# Patient Record
Sex: MALE | Race: OTHER | HISPANIC OR LATINO | URBAN - METROPOLITAN AREA
[De-identification: names, ages, dates, MRNs, and addresses within clinical notes are randomized per-mention and may not be internally consistent; named-entity substitution may affect disease eponyms.]

---

## 2023-08-30 ENCOUNTER — HOSPITAL ENCOUNTER (OUTPATIENT)
Facility: HOSPITAL | Age: 28
Discharge: HOME | End: 2023-09-01
Attending: STUDENT IN AN ORGANIZED HEALTH CARE EDUCATION/TRAINING PROGRAM | Admitting: INTERNAL MEDICINE

## 2023-08-30 DIAGNOSIS — E86.0 DEHYDRATION: ICD-10-CM

## 2023-08-30 DIAGNOSIS — M62.82 NON-TRAUMATIC RHABDOMYOLYSIS: ICD-10-CM

## 2023-08-30 DIAGNOSIS — N17.9 AKI (ACUTE KIDNEY INJURY): Primary | ICD-10-CM

## 2023-08-30 LAB
ALBUMIN SERPL-MCNC: 5.9 G/DL (ref 3.5–5)
ALBUMIN/GLOB SERPL: 1.5 RATIO (ref 1.1–2)
ALP SERPL-CCNC: 83 UNIT/L (ref 40–150)
ALT SERPL-CCNC: 27 UNIT/L (ref 0–55)
AST SERPL-CCNC: 20 UNIT/L (ref 5–34)
BASOPHILS # BLD AUTO: 0.07 X10(3)/MCL
BASOPHILS NFR BLD AUTO: 0.4 %
BILIRUB SERPL-MCNC: 0.6 MG/DL
BUN SERPL-MCNC: 24.9 MG/DL (ref 8.9–20.6)
CALCIUM SERPL-MCNC: 11.2 MG/DL (ref 8.4–10.2)
CHLORIDE SERPL-SCNC: 101 MMOL/L (ref 98–107)
CK SERPL-CCNC: 301 U/L (ref 30–200)
CO2 SERPL-SCNC: 16 MMOL/L (ref 22–29)
CREAT SERPL-MCNC: 3.43 MG/DL (ref 0.73–1.18)
EOSINOPHIL # BLD AUTO: 0.02 X10(3)/MCL (ref 0–0.9)
EOSINOPHIL NFR BLD AUTO: 0.1 %
ERYTHROCYTE [DISTWIDTH] IN BLOOD BY AUTOMATED COUNT: 12.7 % (ref 11.5–17)
GFR SERPLBLD CREATININE-BSD FMLA CKD-EPI: 24 MLS/MIN/1.73/M2
GLOBULIN SER-MCNC: 3.9 GM/DL (ref 2.4–3.5)
GLUCOSE SERPL-MCNC: 139 MG/DL (ref 74–100)
HCT VFR BLD AUTO: 47 % (ref 42–52)
HGB BLD-MCNC: 17.6 G/DL (ref 14–18)
IMM GRANULOCYTES # BLD AUTO: 0.1 X10(3)/MCL (ref 0–0.04)
IMM GRANULOCYTES NFR BLD AUTO: 0.6 %
LYMPHOCYTES # BLD AUTO: 1.71 X10(3)/MCL (ref 0.6–4.6)
LYMPHOCYTES NFR BLD AUTO: 10.8 %
MAGNESIUM SERPL-MCNC: 2 MG/DL (ref 1.6–2.6)
MCH RBC QN AUTO: 29.5 PG (ref 27–31)
MCHC RBC AUTO-ENTMCNC: 37.4 G/DL (ref 33–36)
MCV RBC AUTO: 78.7 FL (ref 80–94)
MONOCYTES # BLD AUTO: 0.65 X10(3)/MCL (ref 0.1–1.3)
MONOCYTES NFR BLD AUTO: 4.1 %
NEUTROPHILS # BLD AUTO: 13.32 X10(3)/MCL (ref 2.1–9.2)
NEUTROPHILS NFR BLD AUTO: 84 %
NRBC BLD AUTO-RTO: 0 %
PLATELET # BLD AUTO: 515 X10(3)/MCL (ref 130–400)
PMV BLD AUTO: 9 FL (ref 7.4–10.4)
POTASSIUM SERPL-SCNC: 3.5 MMOL/L (ref 3.5–5.1)
PROT SERPL-MCNC: 9.8 GM/DL (ref 6.4–8.3)
RBC # BLD AUTO: 5.97 X10(6)/MCL (ref 4.7–6.1)
SODIUM SERPL-SCNC: 138 MMOL/L (ref 136–145)
WBC # SPEC AUTO: 15.87 X10(3)/MCL (ref 4.5–11.5)

## 2023-08-30 PROCEDURE — 63600175 PHARM REV CODE 636 W HCPCS: Performed by: STUDENT IN AN ORGANIZED HEALTH CARE EDUCATION/TRAINING PROGRAM

## 2023-08-30 PROCEDURE — 80053 COMPREHEN METABOLIC PANEL: CPT | Performed by: NURSE PRACTITIONER

## 2023-08-30 PROCEDURE — 85025 COMPLETE CBC W/AUTO DIFF WBC: CPT | Performed by: NURSE PRACTITIONER

## 2023-08-30 PROCEDURE — 25000003 PHARM REV CODE 250: Performed by: NURSE PRACTITIONER

## 2023-08-30 PROCEDURE — 96360 HYDRATION IV INFUSION INIT: CPT

## 2023-08-30 PROCEDURE — 82550 ASSAY OF CK (CPK): CPT | Performed by: NURSE PRACTITIONER

## 2023-08-30 PROCEDURE — 96361 HYDRATE IV INFUSION ADD-ON: CPT

## 2023-08-30 PROCEDURE — 83735 ASSAY OF MAGNESIUM: CPT | Performed by: NURSE PRACTITIONER

## 2023-08-30 PROCEDURE — 99285 EMERGENCY DEPT VISIT HI MDM: CPT | Mod: 25

## 2023-08-30 RX ADMIN — SODIUM CHLORIDE, POTASSIUM CHLORIDE, SODIUM LACTATE AND CALCIUM CHLORIDE 1000 ML: 600; 310; 30; 20 INJECTION, SOLUTION INTRAVENOUS at 11:08

## 2023-08-30 RX ADMIN — SODIUM CHLORIDE 1000 ML: 9 INJECTION, SOLUTION INTRAVENOUS at 10:08

## 2023-08-30 NOTE — Clinical Note
Diagnosis: AJ (acute kidney injury) [655524]   Future Attending Provider: DUC BARTH [[id number]]   Admitting Provider:: DUC BARTH [[id number]]

## 2023-08-31 PROBLEM — M62.82 RHABDOMYOLYSIS: Status: ACTIVE | Noted: 2023-08-31

## 2023-08-31 PROBLEM — N17.9 AKI (ACUTE KIDNEY INJURY): Status: ACTIVE | Noted: 2023-08-31

## 2023-08-31 PROBLEM — D72.829 LEUKOCYTOSIS: Status: ACTIVE | Noted: 2023-08-31

## 2023-08-31 LAB
ALBUMIN SERPL-MCNC: 4.3 G/DL (ref 3.5–5)
ALBUMIN/GLOB SERPL: 1.4 RATIO (ref 1.1–2)
ALP SERPL-CCNC: 64 UNIT/L (ref 40–150)
ALT SERPL-CCNC: 19 UNIT/L (ref 0–55)
AMPHET UR QL SCN: NEGATIVE
ANION GAP SERPL CALC-SCNC: 17 MEQ/L
APPEARANCE UR: CLEAR
AST SERPL-CCNC: 22 UNIT/L (ref 5–34)
BACTERIA #/AREA URNS AUTO: ABNORMAL /HPF
BARBITURATE SCN PRESENT UR: NEGATIVE
BASOPHILS # BLD AUTO: 0.04 X10(3)/MCL
BASOPHILS NFR BLD AUTO: 0.4 %
BENZODIAZ UR QL SCN: NEGATIVE
BILIRUB SERPL-MCNC: 0.7 MG/DL
BILIRUB UR QL STRIP.AUTO: NEGATIVE
BUN SERPL-MCNC: 16.2 MG/DL (ref 8.9–20.6)
BUN SERPL-MCNC: 20.8 MG/DL (ref 8.9–20.6)
CALCIUM SERPL-MCNC: 9.4 MG/DL (ref 8.4–10.2)
CALCIUM SERPL-MCNC: 9.4 MG/DL (ref 8.4–10.2)
CANNABINOIDS UR QL SCN: POSITIVE
CHLORIDE SERPL-SCNC: 105 MMOL/L (ref 98–107)
CHLORIDE SERPL-SCNC: 107 MMOL/L (ref 98–107)
CK SERPL-CCNC: 386 U/L (ref 30–200)
CK SERPL-CCNC: 716 U/L (ref 30–200)
CO2 SERPL-SCNC: 19 MMOL/L (ref 22–29)
CO2 SERPL-SCNC: 25 MMOL/L (ref 22–29)
COCAINE UR QL SCN: NEGATIVE
COLOR UR: ABNORMAL
CREAT SERPL-MCNC: 1.48 MG/DL (ref 0.73–1.18)
CREAT SERPL-MCNC: 2.23 MG/DL (ref 0.73–1.18)
CREAT/UREA NIT SERPL: 9
EOSINOPHIL # BLD AUTO: 0.05 X10(3)/MCL (ref 0–0.9)
EOSINOPHIL NFR BLD AUTO: 0.5 %
ERYTHROCYTE [DISTWIDTH] IN BLOOD BY AUTOMATED COUNT: 13 % (ref 11.5–17)
FENTANYL UR QL SCN: NEGATIVE
GFR SERPLBLD CREATININE-BSD FMLA CKD-EPI: 40 MLS/MIN/1.73/M2
GFR SERPLBLD CREATININE-BSD FMLA CKD-EPI: >60 MLS/MIN/1.73/M2
GLOBULIN SER-MCNC: 3 GM/DL (ref 2.4–3.5)
GLUCOSE SERPL-MCNC: 101 MG/DL (ref 74–100)
GLUCOSE SERPL-MCNC: 110 MG/DL (ref 74–100)
GLUCOSE UR QL STRIP.AUTO: NEGATIVE
HCT VFR BLD AUTO: 41.7 % (ref 42–52)
HGB BLD-MCNC: 14.7 G/DL (ref 14–18)
IMM GRANULOCYTES # BLD AUTO: 0.03 X10(3)/MCL (ref 0–0.04)
IMM GRANULOCYTES NFR BLD AUTO: 0.3 %
KETONES UR QL STRIP.AUTO: NEGATIVE
LEUKOCYTE ESTERASE UR QL STRIP.AUTO: NEGATIVE
LYMPHOCYTES # BLD AUTO: 2.54 X10(3)/MCL (ref 0.6–4.6)
LYMPHOCYTES NFR BLD AUTO: 25.7 %
MCH RBC QN AUTO: 28.9 PG (ref 27–31)
MCHC RBC AUTO-ENTMCNC: 35.3 G/DL (ref 33–36)
MCV RBC AUTO: 81.9 FL (ref 80–94)
MDMA UR QL SCN: NEGATIVE
MONOCYTES # BLD AUTO: 0.72 X10(3)/MCL (ref 0.1–1.3)
MONOCYTES NFR BLD AUTO: 7.3 %
NEUTROPHILS # BLD AUTO: 6.5 X10(3)/MCL (ref 2.1–9.2)
NEUTROPHILS NFR BLD AUTO: 65.8 %
NITRITE UR QL STRIP.AUTO: NEGATIVE
NRBC BLD AUTO-RTO: 0 %
OPIATES UR QL SCN: NEGATIVE
PCP UR QL: NEGATIVE
PH UR STRIP.AUTO: 6 [PH]
PH UR: 6.5 [PH] (ref 3–11)
PLATELET # BLD AUTO: 437 X10(3)/MCL (ref 130–400)
PMV BLD AUTO: 9.1 FL (ref 7.4–10.4)
POTASSIUM SERPL-SCNC: 3.7 MMOL/L (ref 3.5–5.1)
POTASSIUM SERPL-SCNC: 3.8 MMOL/L (ref 3.5–5.1)
PROT SERPL-MCNC: 7.3 GM/DL (ref 6.4–8.3)
PROT UR QL STRIP.AUTO: ABNORMAL
RBC # BLD AUTO: 5.09 X10(6)/MCL (ref 4.7–6.1)
RBC #/AREA URNS AUTO: ABNORMAL /HPF
RBC UR QL AUTO: ABNORMAL
SODIUM SERPL-SCNC: 141 MMOL/L (ref 136–145)
SODIUM SERPL-SCNC: 141 MMOL/L (ref 136–145)
SP GR UR STRIP.AUTO: 1.01 (ref 1–1.03)
SQUAMOUS #/AREA URNS AUTO: ABNORMAL /HPF
UROBILINOGEN UR STRIP-ACNC: 0.2
WBC # SPEC AUTO: 9.88 X10(3)/MCL (ref 4.5–11.5)
WBC #/AREA URNS AUTO: ABNORMAL /HPF

## 2023-08-31 PROCEDURE — G0378 HOSPITAL OBSERVATION PER HR: HCPCS

## 2023-08-31 PROCEDURE — 96361 HYDRATE IV INFUSION ADD-ON: CPT

## 2023-08-31 PROCEDURE — 82550 ASSAY OF CK (CPK): CPT | Performed by: STUDENT IN AN ORGANIZED HEALTH CARE EDUCATION/TRAINING PROGRAM

## 2023-08-31 PROCEDURE — 63600175 PHARM REV CODE 636 W HCPCS: Performed by: INTERNAL MEDICINE

## 2023-08-31 PROCEDURE — 80307 DRUG TEST PRSMV CHEM ANLYZR: CPT | Performed by: INTERNAL MEDICINE

## 2023-08-31 PROCEDURE — 80053 COMPREHEN METABOLIC PANEL: CPT | Performed by: STUDENT IN AN ORGANIZED HEALTH CARE EDUCATION/TRAINING PROGRAM

## 2023-08-31 PROCEDURE — 81001 URINALYSIS AUTO W/SCOPE: CPT | Performed by: NURSE PRACTITIONER

## 2023-08-31 PROCEDURE — 63600175 PHARM REV CODE 636 W HCPCS: Performed by: STUDENT IN AN ORGANIZED HEALTH CARE EDUCATION/TRAINING PROGRAM

## 2023-08-31 PROCEDURE — 82550 ASSAY OF CK (CPK): CPT | Mod: 91 | Performed by: INTERNAL MEDICINE

## 2023-08-31 PROCEDURE — 85025 COMPLETE CBC W/AUTO DIFF WBC: CPT | Performed by: STUDENT IN AN ORGANIZED HEALTH CARE EDUCATION/TRAINING PROGRAM

## 2023-08-31 PROCEDURE — 80048 BASIC METABOLIC PNL TOTAL CA: CPT | Performed by: STUDENT IN AN ORGANIZED HEALTH CARE EDUCATION/TRAINING PROGRAM

## 2023-08-31 RX ORDER — TALC
6 POWDER (GRAM) TOPICAL NIGHTLY PRN
Status: DISCONTINUED | OUTPATIENT
Start: 2023-08-31 | End: 2023-09-01 | Stop reason: HOSPADM

## 2023-08-31 RX ORDER — SODIUM CHLORIDE, SODIUM LACTATE, POTASSIUM CHLORIDE, CALCIUM CHLORIDE 600; 310; 30; 20 MG/100ML; MG/100ML; MG/100ML; MG/100ML
1000 INJECTION, SOLUTION INTRAVENOUS
Status: COMPLETED | OUTPATIENT
Start: 2023-08-31 | End: 2023-08-31

## 2023-08-31 RX ORDER — SODIUM CHLORIDE 0.9 % (FLUSH) 0.9 %
10 SYRINGE (ML) INJECTION
Status: DISCONTINUED | OUTPATIENT
Start: 2023-08-31 | End: 2023-09-01 | Stop reason: HOSPADM

## 2023-08-31 RX ORDER — SODIUM CHLORIDE, SODIUM LACTATE, POTASSIUM CHLORIDE, CALCIUM CHLORIDE 600; 310; 30; 20 MG/100ML; MG/100ML; MG/100ML; MG/100ML
INJECTION, SOLUTION INTRAVENOUS CONTINUOUS
Status: DISCONTINUED | OUTPATIENT
Start: 2023-08-31 | End: 2023-09-01 | Stop reason: HOSPADM

## 2023-08-31 RX ORDER — ACETAMINOPHEN 325 MG/1
650 TABLET ORAL EVERY 6 HOURS PRN
Status: DISCONTINUED | OUTPATIENT
Start: 2023-08-31 | End: 2023-09-01 | Stop reason: HOSPADM

## 2023-08-31 RX ORDER — ONDANSETRON 2 MG/ML
4 INJECTION INTRAMUSCULAR; INTRAVENOUS EVERY 6 HOURS PRN
Status: DISCONTINUED | OUTPATIENT
Start: 2023-08-31 | End: 2023-09-01 | Stop reason: HOSPADM

## 2023-08-31 RX ADMIN — SODIUM CHLORIDE, POTASSIUM CHLORIDE, SODIUM LACTATE AND CALCIUM CHLORIDE: 600; 310; 30; 20 INJECTION, SOLUTION INTRAVENOUS at 04:08

## 2023-08-31 RX ADMIN — SODIUM CHLORIDE, POTASSIUM CHLORIDE, SODIUM LACTATE AND CALCIUM CHLORIDE 1000 ML: 600; 310; 30; 20 INJECTION, SOLUTION INTRAVENOUS at 12:08

## 2023-08-31 RX ADMIN — SODIUM CHLORIDE, POTASSIUM CHLORIDE, SODIUM LACTATE AND CALCIUM CHLORIDE: 600; 310; 30; 20 INJECTION, SOLUTION INTRAVENOUS at 03:08

## 2023-08-31 NOTE — HPI
Chief complaint: Dehydration    History given by: patient    HPI: 27 year old M patient with no PMH presents to the ER with concerns for dehydration. 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 His entire body was tingling. Denies muscle cramps. Denies nausea or vomiting. He only urinated once.     I personally spoke with ED clinician regarding the case and reviewed their notes. I personally reviewed all imaging and lab findings as well as any prior medical records that were available within the chart prior to admission. Workup in the ER showed  Cr 3.43 and .

## 2023-08-31 NOTE — SUBJECTIVE & OBJECTIVE
No past medical history on file.    No past surgical history on file.    Review of patient's allergies indicates:  No Known Allergies    No current facility-administered medications on file prior to encounter.     No current outpatient medications on file prior to encounter.     Family History    Reviewed and negative in relation to patient's condition        Tobacco Use    Smoking status: Not on file    Smokeless tobacco: Not on file   Substance and Sexual Activity    Alcohol use: Not on file    Drug use: Not on file    Sexual activity: Not on file     Review of Systems  Except as documented, all other systems are negative.      Objective:     Vital Signs (Most Recent):  Temp: 98.2 °F (36.8 °C) (08/30/23 2223)  Pulse: 65 (08/31/23 0230)  Resp: 20 (08/31/23 0230)  BP: (!) 109/58 (08/31/23 0230)  SpO2: 97 % (08/31/23 0230) Vital Signs (24h Range):  Temp:  [98.2 °F (36.8 °C)] 98.2 °F (36.8 °C)  Pulse:  [65-98] 65  Resp:  [18-20] 20  SpO2:  [97 %-100 %] 97 %  BP: (109-118)/(58-81) 109/58     Weight: 74.8 kg (165 lb)  Body mass index is 27.49 kg/m².     Physical Exam     CONSTITUTIONAL: vitals as noted, alert, awake, no distress  HEENT: No scleral icterus, oropharynx clear  RESPIRATORY: clear to auscultation  CVS: regular rate and rhythm  GASTROINTESTINAL: soft, non-tender, non-distended  NEURO: grossly normal exam, moves all extremities  HEM/LYMPH: no lymphadenopathy  PSYCH: alert and oriented x 3, normal affect  SKIN: no rashes noted         Significant Labs: All pertinent labs within the past 24 hours have been reviewed.  CBC:   Recent Labs   Lab 08/30/23 2239   WBC 15.87*   HGB 17.6   HCT 47.0   *     CMP:   Recent Labs   Lab 08/30/23 2239 08/31/23  0040    141   K 3.5 3.8   CO2 16* 19*   BUN 24.9* 20.8*   CREATININE 3.43* 2.23*   CALCIUM 11.2* 9.4   ALBUMIN 5.9*  --    BILITOT 0.6  --    ALKPHOS 83  --    AST 20  --    ALT 27  --

## 2023-08-31 NOTE — H&P
[facility]  [facility] [place]  History & Physical    Patient Name: Torsten Chicas  MRN: [id number]  Patient Class: OP- Observation  Admission Date: 8/30/2023  Attending Physician: Leora Zhang MD   Primary Care Provider: No primary care provider on file.         Patient information was obtained from patient and ER records.     Subjective:     Principal Problem:AJ (acute kidney injury)    Chief Complaint:   Chief Complaint   Patient presents with    Dehydration        HPI: Chief complaint: Dehydration    History given by: patient    HPI: 27 year old M patient with no PMH presents to the ER with concerns for dehydration. 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 His entire body was tingling. Denies muscle cramps. Denies nausea or vomiting. He only urinated once.     I personally spoke with ED clinician regarding the case and reviewed their notes. I personally reviewed all imaging and lab findings as well as any prior medical records that were available within the chart prior to admission. Workup in the ER showed  Cr 3.43 and .       No past medical history on file.    No past surgical history on file.    Review of patient's allergies indicates:  No Known Allergies    No current facility-administered medications on file prior to encounter.     No current outpatient medications on file prior to encounter.     Family History    Reviewed and negative in relation to patient's condition        Tobacco Use    Smoking status: Not on file    Smokeless tobacco: Not on file   Substance and Sexual Activity    Alcohol use: Not on file    Drug use: Not on file    Sexual activity: Not on file     Review of Systems  Except as documented, all other systems are negative.      Objective:     Vital Signs (Most Recent):  Temp: 98.2 °F (36.8 °C) (08/30/23 2223)  Pulse: 65 (08/31/23 0230)  Resp: 20 (08/31/23 0230)  BP: (!) 109/58 (08/31/23 0230)  SpO2: 97 % (08/31/23 0230)  Vital Signs (24h Range):  Temp:  [98.2 °F (36.8 °C)] 98.2 °F (36.8 °C)  Pulse:  [65-98] 65  Resp:  [18-20] 20  SpO2:  [97 %-100 %] 97 %  BP: (109-118)/(58-81) 109/58     Weight: 74.8 kg (165 lb)  Body mass index is 27.49 kg/m².     Physical Exam     CONSTITUTIONAL: vitals as noted, alert, awake, no distress  HEENT: No scleral icterus, oropharynx clear  RESPIRATORY: clear to auscultation  CVS: regular rate and rhythm  GASTROINTESTINAL: soft, non-tender, non-distended  NEURO: grossly normal exam, moves all extremities  HEM/LYMPH: no lymphadenopathy  PSYCH: alert and oriented x 3, normal affect  SKIN: no rashes noted         Significant Labs: All pertinent labs within the past 24 hours have been reviewed.  CBC:   Recent Labs   Lab 08/30/23 2239   WBC 15.87*   HGB 17.6   HCT 47.0   *     CMP:   Recent Labs   Lab 08/30/23 2239 08/31/23  0040    141   K 3.5 3.8   CO2 16* 19*   BUN 24.9* 20.8*   CREATININE 3.43* 2.23*   CALCIUM 11.2* 9.4   ALBUMIN 5.9*  --    BILITOT 0.6  --    ALKPHOS 83  --    AST 20  --    ALT 27  --      Assessment/Plan:     * AJ (acute kidney injury)  - prerenal 2/2 dehydration and heat exposure  - will give IV fluids and monitor kidney function    Rhabdomyolysis  - IV fluids and trend CK    Leukocytosis  - likely from hemoconcentration      VTE Risk Mitigation (From admission, onward)         Ordered     Place sequential compression device  Until discontinued         08/31/23 0153     IP VTE LOW RISK PATIENT  Once         08/31/23 0153                On 08/31/2023, patient should be placed in hospital observation services under my care.    Service was provided using HIPAA compliant web platform using SOC for audio/visual equipment.   Patient Location: Hospital  Provider Location: Home ([place], [place])  Participants on call: bedside RN, patient  Consent was obtained, and the patient was seen with nurse assisting from the bedside.    Irina Howard MD  Department of Hospital  Medicine  [facility] - Emergency Dept

## 2023-08-31 NOTE — ED PROVIDER NOTES
Encounter Date: 8/30/2023       History     Chief Complaint   Patient presents with    Dehydration     The history is provided by the patient and a friend. The history is limited by a language barrier. A  was used.       27-year-old male with no stated past medical history presents emergency department for concerns of dehydration.  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  States that urinate once today.  States he started having body aches muscle spasms and nausea vomiting.    Review of patient's allergies indicates:  No Known Allergies  No past medical history on file.  No past surgical history on file.  No family history on file.     Review of Systems   Constitutional:  Positive for fatigue. Negative for fever.   Respiratory:  Negative for cough and shortness of breath.    Cardiovascular:  Negative for chest pain.   Gastrointestinal:  Positive for nausea and vomiting. Negative for abdominal pain, constipation and diarrhea.   Musculoskeletal:  Positive for myalgias.   All other systems reviewed and are negative.      Physical Exam     Initial Vitals [08/30/23 2223]   BP Pulse Resp Temp SpO2   118/81 98 18 98.2 °F (36.8 °C) 100 %      MAP       --         Physical Exam    Nursing note and vitals reviewed.  Constitutional: He appears well-developed and well-nourished. No distress.   Cardiovascular:  Normal rate and regular rhythm.           Pulmonary/Chest: Breath sounds normal. He has no wheezes.   Abdominal: Abdomen is soft. There is no abdominal tenderness.   Musculoskeletal:         General: No tenderness. Normal range of motion.     Neurological: He is alert and oriented to person, place, and time.   Skin: Skin is dry. Capillary refill takes less than 2 seconds.   Psychiatric: He has a normal mood and affect. Thought content normal.         ED Course   Critical Care    Date/Time: 8/31/2023 1:11 AM    Performed by: Jon Thompson MD  Authorized  by: Jon Thompson MD  Direct patient critical care time: 35 minutes  Total critical care time (exclusive of procedural time) : 35 minutes  Critical care time was exclusive of separately billable procedures and treating other patients.  Critical care was necessary to treat or prevent imminent or life-threatening deterioration of the following conditions: renal failure.  Critical care was time spent personally by me on the following activities: development of treatment plan with patient or surrogate, evaluation of patient's response to treatment, examination of patient, obtaining history from patient or surrogate, ordering and performing treatments and interventions, ordering and review of laboratory studies and re-evaluation of patient's condition.        Labs Reviewed   CBC WITH DIFFERENTIAL - Abnormal; Notable for the following components:       Result Value    WBC 15.87 (*)     MCV 78.7 (*)     MCHC 37.4 (*)     Platelet 515 (*)     Neut # 13.32 (*)     IG# 0.10 (*)     All other components within normal limits   COMPREHENSIVE METABOLIC PANEL - Abnormal; Notable for the following components:    Carbon Dioxide 16 (*)     Glucose Level 139 (*)     Blood Urea Nitrogen 24.9 (*)     Creatinine 3.43 (*)     Calcium Level Total 11.2 (*)     Protein Total 9.8 (*)     Albumin Level 5.9 (*)     Globulin 3.9 (*)     All other components within normal limits   CK - Abnormal; Notable for the following components:    Creatine Kinase 301 (*)     All other components within normal limits   URINALYSIS - Abnormal; Notable for the following components:    Protein, UA Trace (*)     Blood, UA Small (*)     All other components within normal limits   BASIC METABOLIC PANEL - Abnormal; Notable for the following components:    Carbon Dioxide 19 (*)     Glucose Level 110 (*)     Blood Urea Nitrogen 20.8 (*)     Creatinine 2.23 (*)     All other components within normal limits   CK - Abnormal; Notable for the following components:     Creatine Kinase 386 (*)     All other components within normal limits   URINALYSIS, MICROSCOPIC - Abnormal; Notable for the following components:    Squamous Epithelial Cells, UA Few (*)     All other components within normal limits   MAGNESIUM - Normal   CBC W/ AUTO DIFFERENTIAL    Narrative:     The following orders were created for panel order CBC auto differential.  Procedure                               Abnormality         Status                     ---------                               -----------         ------                     CBC with Differential[[id number]]                                                         Please view results for these tests on the individual orders.   COMPREHENSIVE METABOLIC PANEL   CBC WITH DIFFERENTIAL          Imaging Results    None          Medications   sodium chloride 0.9% flush 10 mL (has no administration in time range)   melatonin tablet 6 mg (has no administration in time range)   sodium chloride 0.9% bolus 1,000 mL 1,000 mL (0 mLs Intravenous Stopped 8/30/23 2320)   lactated ringers bolus 1,000 mL (0 mLs Intravenous Stopped 8/31/23 0017)   lactated ringers infusion (1,000 mLs Intravenous New Bag 8/31/23 0055)     Medical Decision Making  differential diagnosis  Dehydration, metabolic derangement, rhabdomyolysis,  as well as multiple other possible etiologies      Problems Addressed:  AJ (acute kidney injury): acute illness or injury  Dehydration: acute illness or injury  Non-traumatic rhabdomyolysis: acute illness or injury    Amount and/or Complexity of Data Reviewed  Labs:  Decision-making details documented in ED Course.    Risk  Prescription drug management.  Decision regarding hospitalization.               ED Course as of 08/31/23 0153   Wed Aug 30, 2023   2253 WBC(!): 15.87 [BS]   2253 Hemoglobin: 17.6 [BS]   2253 Hematocrit: 47.0 [BS]   2253 Platelets(!): 515 [BS]   2309 Creatinine(!): 3.43 [BS]   2309 BUN(!): 24.9 [BS]   2309 CO2(!): 16 [BS]   2309  Sodium: 138 [BS]   2309 CPK(!): 301 [BS]   2309 Magnesium: 2.00 [BS]   Thu Aug 31, 2023   0109 Sodium: 141 [BS]   0109 Potassium: 3.8 [BS]   0109 Chloride: 105 [BS]   0109 CO2(!): 19 [BS]   0109 Glucose(!): 110 [BS]   0109 BUN(!): 20.8 [BS]   0109 Creatinine(!): 2.23 [BS]   0110 Will admit for further IVF [BS]   0153 Hospitalist agrees with admission [BS]      ED Course User Index  [BS] Jon Thompson MD                    Clinical Impression:   Final diagnoses:  [N17.9] AJ (acute kidney injury) (Primary)  [E86.0] Dehydration  [M62.82] Non-traumatic rhabdomyolysis        ED Disposition Condition    Observation                 Jon Thompson MD  08/31/23 9842

## 2023-08-31 NOTE — NURSING
..Nurses Note -- 4 Eyes      8/31/2023   3:30 PM      Skin assessed during: Admit      [x] No Altered Skin Integrity Present    []Prevention Measures Documented      [] Yes- Altered Skin Integrity Present or Discovered   [] LDA Added if Not in Epic (Describe Wound)   [] New Altered Skin Integrity was Present on Admit and Documented in LDA   [] Wound Image Taken    Wound Care Consulted? No    Attending Nurse:  Bisi Sinha RN/Staff Member:   DONA Craft

## 2023-09-01 VITALS
HEART RATE: 56 BPM | OXYGEN SATURATION: 99 % | RESPIRATION RATE: 20 BRPM | WEIGHT: 141.31 LBS | TEMPERATURE: 98 F | HEIGHT: 65 IN | BODY MASS INDEX: 23.54 KG/M2 | SYSTOLIC BLOOD PRESSURE: 116 MMHG | DIASTOLIC BLOOD PRESSURE: 77 MMHG

## 2023-09-01 PROBLEM — D72.829 LEUKOCYTOSIS: Status: RESOLVED | Noted: 2023-08-31 | Resolved: 2023-09-01

## 2023-09-01 PROBLEM — N17.9 AKI (ACUTE KIDNEY INJURY): Status: RESOLVED | Noted: 2023-08-31 | Resolved: 2023-09-01

## 2023-09-01 LAB
ALBUMIN SERPL-MCNC: 3.9 G/DL (ref 3.5–5)
ALBUMIN/GLOB SERPL: 1.7 RATIO (ref 1.1–2)
ALP SERPL-CCNC: 57 UNIT/L (ref 40–150)
ALT SERPL-CCNC: 24 UNIT/L (ref 0–55)
AST SERPL-CCNC: 33 UNIT/L (ref 5–34)
BASOPHILS # BLD AUTO: 0.07 X10(3)/MCL
BASOPHILS NFR BLD AUTO: 1.2 %
BILIRUB SERPL-MCNC: 0.5 MG/DL
BUN SERPL-MCNC: 11.1 MG/DL (ref 8.9–20.6)
CALCIUM SERPL-MCNC: 9.2 MG/DL (ref 8.4–10.2)
CHLORIDE SERPL-SCNC: 107 MMOL/L (ref 98–107)
CK SERPL-CCNC: 845 U/L (ref 30–200)
CO2 SERPL-SCNC: 23 MMOL/L (ref 22–29)
CREAT SERPL-MCNC: 0.84 MG/DL (ref 0.73–1.18)
EOSINOPHIL # BLD AUTO: 0.46 X10(3)/MCL (ref 0–0.9)
EOSINOPHIL NFR BLD AUTO: 7.9 %
ERYTHROCYTE [DISTWIDTH] IN BLOOD BY AUTOMATED COUNT: 13.3 % (ref 11.5–17)
GFR SERPLBLD CREATININE-BSD FMLA CKD-EPI: >60 MLS/MIN/1.73/M2
GLOBULIN SER-MCNC: 2.3 GM/DL (ref 2.4–3.5)
GLUCOSE SERPL-MCNC: 92 MG/DL (ref 74–100)
HCT VFR BLD AUTO: 40.4 % (ref 42–52)
HGB BLD-MCNC: 13.9 G/DL (ref 14–18)
IMM GRANULOCYTES # BLD AUTO: 0.02 X10(3)/MCL (ref 0–0.04)
IMM GRANULOCYTES NFR BLD AUTO: 0.3 %
LYMPHOCYTES # BLD AUTO: 2.27 X10(3)/MCL (ref 0.6–4.6)
LYMPHOCYTES NFR BLD AUTO: 38.9 %
MAGNESIUM SERPL-MCNC: 1.9 MG/DL (ref 1.6–2.6)
MCH RBC QN AUTO: 28.7 PG (ref 27–31)
MCHC RBC AUTO-ENTMCNC: 34.4 G/DL (ref 33–36)
MCV RBC AUTO: 83.5 FL (ref 80–94)
MONOCYTES # BLD AUTO: 0.66 X10(3)/MCL (ref 0.1–1.3)
MONOCYTES NFR BLD AUTO: 11.3 %
NEUTROPHILS # BLD AUTO: 2.35 X10(3)/MCL (ref 2.1–9.2)
NEUTROPHILS NFR BLD AUTO: 40.4 %
NRBC BLD AUTO-RTO: 0 %
PLATELET # BLD AUTO: 360 X10(3)/MCL (ref 130–400)
PMV BLD AUTO: 9 FL (ref 7.4–10.4)
POTASSIUM SERPL-SCNC: 4 MMOL/L (ref 3.5–5.1)
PROT SERPL-MCNC: 6.2 GM/DL (ref 6.4–8.3)
RBC # BLD AUTO: 4.84 X10(6)/MCL (ref 4.7–6.1)
SODIUM SERPL-SCNC: 137 MMOL/L (ref 136–145)
WBC # SPEC AUTO: 5.83 X10(3)/MCL (ref 4.5–11.5)

## 2023-09-01 PROCEDURE — 25000003 PHARM REV CODE 250: Performed by: INTERNAL MEDICINE

## 2023-09-01 PROCEDURE — 82550 ASSAY OF CK (CPK): CPT | Performed by: INTERNAL MEDICINE

## 2023-09-01 PROCEDURE — 83735 ASSAY OF MAGNESIUM: CPT | Performed by: INTERNAL MEDICINE

## 2023-09-01 PROCEDURE — 96361 HYDRATE IV INFUSION ADD-ON: CPT

## 2023-09-01 PROCEDURE — 80053 COMPREHEN METABOLIC PANEL: CPT | Performed by: INTERNAL MEDICINE

## 2023-09-01 PROCEDURE — 85025 COMPLETE CBC W/AUTO DIFF WBC: CPT | Performed by: INTERNAL MEDICINE

## 2023-09-01 PROCEDURE — G0378 HOSPITAL OBSERVATION PER HR: HCPCS

## 2023-09-01 PROCEDURE — 63600175 PHARM REV CODE 636 W HCPCS: Performed by: INTERNAL MEDICINE

## 2023-09-01 RX ADMIN — SODIUM CHLORIDE, POTASSIUM CHLORIDE, SODIUM LACTATE AND CALCIUM CHLORIDE: 600; 310; 30; 20 INJECTION, SOLUTION INTRAVENOUS at 04:09

## 2023-09-01 RX ADMIN — SODIUM CHLORIDE 1000 ML: 9 INJECTION, SOLUTION INTRAVENOUS at 09:09

## 2023-09-01 NOTE — DISCHARGE SUMMARY
[facility] - Observation Unit  Hospital Medicine  Discharge Summary      Patient Name: Torsten Chicas  MRN: [id number]  CYNTHIA: [id number]  Patient Class: OP- Observation  Admission Date: 8/30/2023  Hospital Length of Stay: 0 days  Discharge Date and Time:  09/01/2023 9:06 AM  Attending Physician: Leora Zhang MD   Discharging Provider: Leonard Leavitt MD  Primary Care Provider: Janna, Primary Doctor    Primary Care Team: Networked reference to record PCT         27-year-old Brazilian-speaking male with no known past medical history presented to ER with concerns for due to hydration and lab work revealed mild rhabdomyolysis, AJ requiring aggressive fluid resuscitation.  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 hydration.  His symptoms, AJ, rhabdomyolysis had resolved with IV hydration.  He will be discharged to home today.  Counseled on marijuana cessation, adequate p.o. intake    Dehydration  -improving  AJ, mild CPK elevation due to above-resolving  Marijuana use    Goals of Care Treatment Preferences:  Code Status: Full Code    Vitals:    09/01/23 0700   BP: 116/77   Pulse: (!) 56   Resp:    Temp: 98 °F (36.7 °C)        General: Comfortable, not in distress  Respiratory: Clear to auscultation bilaterally, nonlabored breathing  Cardiovascular: RRR, S1, S2  Abdominal: Soft, nontender, nondistended  Neurological: AOx4, no focal deficits  Psychiatric: Cooperative          Consults:     No new Assessment & Plan notes have been filed under this hospital service since the last note was generated.  Service: Hospital Medicine    Final Active Diagnoses:    Diagnosis Date Noted POA    Rhabdomyolysis [M62.82] 08/31/2023 Yes      Problems Resolved During this Admission:    Diagnosis Date Noted Date Resolved POA    PRINCIPAL PROBLEM:  AJ (acute kidney injury) [N17.9] 08/31/2023 09/01/2023 Yes    Leukocytosis [D72.829] 08/31/2023 09/01/2023 Yes       Discharged  Condition: good    Disposition: Home or Self Care    Follow Up:    Patient Instructions:   No discharge procedures on file.    Significant Diagnostic Studies: Labs:   CMP   Recent Labs   Lab 08/30/23  2239 08/31/23  0040 08/31/23  0540 09/01/23  0455    141 141 137   K 3.5 3.8 3.7 4.0   CO2 16* 19* 25 23   BUN 24.9* 20.8* 16.2 11.1   CREATININE 3.43* 2.23* 1.48* 0.84   CALCIUM 11.2* 9.4 9.4 9.2   ALBUMIN 5.9*  --  4.3 3.9   BILITOT 0.6  --  0.7 0.5   ALKPHOS 83  --  64 57   AST 20  --  22 33   ALT 27  --  19 24       Pending Diagnostic Studies:       None           Medications:  Reconciled Home Medications:      Medication List      You have not been prescribed any medications.         Indwelling Lines/Drains at time of discharge:   Lines/Drains/Airways       None                   Time spent on the discharge of patient: 35 minutes         Leonard Leavitt MD  Department of Hospital Medicine  [facility] - Observation Unit